# Patient Record
Sex: MALE | HISPANIC OR LATINO | Employment: FULL TIME | URBAN - METROPOLITAN AREA
[De-identification: names, ages, dates, MRNs, and addresses within clinical notes are randomized per-mention and may not be internally consistent; named-entity substitution may affect disease eponyms.]

---

## 2019-07-18 ENCOUNTER — OFFICE VISIT (OUTPATIENT)
Dept: URGENT CARE | Facility: CLINIC | Age: 39
End: 2019-07-18
Payer: OTHER MISCELLANEOUS

## 2019-07-18 VITALS
WEIGHT: 200 LBS | BODY MASS INDEX: 27.09 KG/M2 | HEART RATE: 69 BPM | HEIGHT: 72 IN | RESPIRATION RATE: 19 BRPM | OXYGEN SATURATION: 100 % | TEMPERATURE: 98 F | DIASTOLIC BLOOD PRESSURE: 93 MMHG | SYSTOLIC BLOOD PRESSURE: 138 MMHG

## 2019-07-18 DIAGNOSIS — T14.8XXA PUNCTURE WOUND: Primary | ICD-10-CM

## 2019-07-18 PROCEDURE — 90714 TD VACC NO PRESV 7 YRS+ IM: CPT | Mod: S$GLB,,, | Performed by: FAMILY MEDICINE

## 2019-07-18 PROCEDURE — 99203 OFFICE O/P NEW LOW 30 MIN: CPT | Mod: 25,S$GLB,, | Performed by: FAMILY MEDICINE

## 2019-07-18 PROCEDURE — 90714 TD VACCINE GREATER THAN OR EQUAL TO 7YO WITH PRESERVATIVE IM: ICD-10-PCS | Mod: S$GLB,,, | Performed by: FAMILY MEDICINE

## 2019-07-18 PROCEDURE — 90471 IMMUNIZATION ADMIN: CPT | Mod: S$GLB,,, | Performed by: FAMILY MEDICINE

## 2019-07-18 PROCEDURE — 99203 PR OFFICE/OUTPT VISIT, NEW, LEVL III, 30-44 MIN: ICD-10-PCS | Mod: 25,S$GLB,, | Performed by: FAMILY MEDICINE

## 2019-07-18 PROCEDURE — 90471 TD VACCINE GREATER THAN OR EQUAL TO 7YO WITH PRESERVATIVE IM: ICD-10-PCS | Mod: S$GLB,,, | Performed by: FAMILY MEDICINE

## 2019-07-18 RX ORDER — AMLODIPINE BESYLATE 5 MG/1
5 TABLET ORAL DAILY
COMMUNITY

## 2019-07-18 NOTE — PROGRESS NOTES
Subjective:       Patient ID: Nick Villegas is a 38 y.o. male.    Vitals:  height is 6' (1.829 m) and weight is 90.7 kg (200 lb). His oral temperature is 97.8 °F (36.6 °C). His blood pressure is 138/93 (abnormal) and his pulse is 69. His respiration is 19 and oxygen saturation is 100%.     Chief Complaint: Laceration (R arm) and Immunizations    38 Male sustained a puncture wound near his right elbow 24 hours prior. Wound was from a non-daysi nail attached to a piece of wood. Penetration was 0.5 inches. Denies any pain around wound. Denies any d/c. Pt denies any tightness of jaw or other muscles.    Laceration    The incident occurred 12 to 24 hours ago. The laceration is located on the right arm. The laceration is 4 cm in size. The laceration mechanism was a nail. The patient is experiencing no pain. He reports no foreign bodies present. His tetanus status is out of date.       Constitution: Negative for chills, fatigue and fever.   HENT: Negative for congestion and sore throat.    Neck: Negative for painful lymph nodes.   Cardiovascular: Negative for chest pain and leg swelling.   Eyes: Negative for double vision and blurred vision.   Respiratory: Negative for cough and shortness of breath.    Gastrointestinal: Negative for nausea, vomiting and diarrhea.   Genitourinary: Negative for dysuria, frequency and urgency.   Musculoskeletal: Negative for joint pain, joint swelling, muscle cramps and muscle ache.   Skin: Positive for laceration. Negative for color change, pale, rash and erythema.   Allergic/Immunologic: Negative for seasonal allergies.   Neurological: Negative for dizziness, history of vertigo, light-headedness, passing out and headaches.   Hematologic/Lymphatic: Negative for swollen lymph nodes, easy bruising/bleeding and history of blood clots. Does not bruise/bleed easily.   Psychiatric/Behavioral: Negative for nervous/anxious, sleep disturbance and depression. The patient is not nervous/anxious.       "  Objective:      Physical Exam   Constitutional: He is oriented to person, place, and time. He appears well-developed and well-nourished.   HENT:   Mouth/Throat: Uvula is midline, oropharynx is clear and moist and mucous membranes are normal.   No signs of lock jaw   Eyes: Pupils are equal, round, and reactive to light. Conjunctivae and EOM are normal.   Neck: Normal range of motion. Neck supple.   Musculoskeletal: Normal range of motion.   Neurological: He is alert and oriented to person, place, and time. He has normal strength. No cranial nerve deficit. He exhibits normal muscle tone. He displays a negative Romberg sign. Coordination normal.   Skin: Skin is warm and dry. Capillary refill takes less than 2 seconds. Laceration noted. No abrasion, no bruising, no burn, no ecchymosis, no lesion and no petechiae noted. No erythema.        Well healed. No tenderness. Full ROM               Assessment:       1. Puncture wound        Plan:         Puncture wound  -     (In Office Administered) Td Vaccine      Patient Instructions     Immunization Information: Tetanus  You received a tetanus shot today. This shot was given to protect you from an infection with the tetanus bacteria. These bacteria are found in the soil. You can get a tetanus infection (also called "lockjaw") from a dirty wound, cut, puncture, abrasion, or other break in the skin. Tetanus can be deadly. For this reason, it is vital to be vaccinated against it. If you have never had a tetanus vaccination, 3 shots are needed to fully protect you. You received the first shot today. You can get your next 2 shots:  · From your usual healthcare provider  · From the local public health department  · From our facility  When to have your next 2 shots  · Return for your next shot in 4 weeks: _________________________________________________     · Return for your last shot in 6 to 12 months:  ___________________________________________________     Once you are " "finished with the 3 shots, you will be fully immunized. This means you will be protected against getting tetanus for up to 10 years.  If you have an injury that is very risky before this time, you may receive a "booster" shot.  To help you remember the date of your last tetanus shot, write it on the back of your 's license or other ID.  When to seek medical advice  After a tetanus shot, most persons have only mild soreness in the arm for a day or so. Contact your healthcare provider or this facility if you develop symptoms of allergic reaction, which include:  · Pain, redness, and swelling at the injection site  · Trouble breathing  · Rash  Date Last Reviewed: 9/25/2015  © 7561-1285 [employer]. [address] [place] [address]. All rights reserved. This information is not intended as a substitute for professional medical care. Always follow your healthcare professional's instructions.        Immunization Information: Tetanus  You received a tetanus shot today. This shot was given to protect you from an infection with the tetanus bacteria. These bacteria are found in the soil. You can get a tetanus infection (also called "lockjaw") from a dirty wound, cut, puncture, abrasion, or other break in the skin. Tetanus can be deadly. For this reason, it is vital to be vaccinated against it. If you have never had a tetanus vaccination, 3 shots are needed to fully protect you. You received the first shot today. You can get your next 2 shots:  · From your usual healthcare provider  · From the local public health department  · From our facility  When to have your next 2 shots  · Return for your next shot in 4 weeks: _________________________________________________     · Return for your last shot in 6 to 12 months:  ___________________________________________________     Once you are finished with the 3 shots, you will be fully immunized. This means you will be protected against getting tetanus for " "up to 10 years.  If you have an injury that is very risky before this time, you may receive a "booster" shot.  To help you remember the date of your last tetanus shot, write it on the back of your 's license or other ID.  When to seek medical advice  After a tetanus shot, most persons have only mild soreness in the arm for a day or so. Contact your healthcare provider or this facility if you develop symptoms of allergic reaction, which include:  · Pain, redness, and swelling at the injection site  · Trouble breathing  · Rash  Date Last Reviewed: 9/25/2015  © 5281-8690 [employer]. [address] [place] [address]. All rights reserved. This information is not intended as a substitute for professional medical care. Always follow your healthcare professional's instructions.             "

## 2019-07-18 NOTE — PATIENT INSTRUCTIONS
"  Immunization Information: Tetanus  You received a tetanus shot today. This shot was given to protect you from an infection with the tetanus bacteria. These bacteria are found in the soil. You can get a tetanus infection (also called "lockjaw") from a dirty wound, cut, puncture, abrasion, or other break in the skin. Tetanus can be deadly. For this reason, it is vital to be vaccinated against it. If you have never had a tetanus vaccination, 3 shots are needed to fully protect you. You received the first shot today. You can get your next 2 shots:  · From your usual healthcare provider  · From the local public health department  · From our facility  When to have your next 2 shots  · Return for your next shot in 4 weeks: _________________________________________________     · Return for your last shot in 6 to 12 months:  ___________________________________________________     Once you are finished with the 3 shots, you will be fully immunized. This means you will be protected against getting tetanus for up to 10 years.  If you have an injury that is very risky before this time, you may receive a "booster" shot.  To help you remember the date of your last tetanus shot, write it on the back of your 's license or other ID.  When to seek medical advice  After a tetanus shot, most persons have only mild soreness in the arm for a day or so. Contact your healthcare provider or this facility if you develop symptoms of allergic reaction, which include:  · Pain, redness, and swelling at the injection site  · Trouble breathing  · Rash  Date Last Reviewed: 9/25/2015  © 8063-3625 [employer]. [address] [place] [id number]. All rights reserved. This information is not intended as a substitute for professional medical care. Always follow your healthcare professional's instructions.        Immunization Information: Tetanus  You received a tetanus shot today. This shot was given to protect you from an " "infection with the tetanus bacteria. These bacteria are found in the soil. You can get a tetanus infection (also called "lockjaw") from a dirty wound, cut, puncture, abrasion, or other break in the skin. Tetanus can be deadly. For this reason, it is vital to be vaccinated against it. If you have never had a tetanus vaccination, 3 shots are needed to fully protect you. You received the first shot today. You can get your next 2 shots:  · From your usual healthcare provider  · From the local public health department  · From our facility  When to have your next 2 shots  · Return for your next shot in 4 weeks: _________________________________________________     · Return for your last shot in 6 to 12 months:  ___________________________________________________     Once you are finished with the 3 shots, you will be fully immunized. This means you will be protected against getting tetanus for up to 10 years.  If you have an injury that is very risky before this time, you may receive a "booster" shot.  To help you remember the date of your last tetanus shot, write it on the back of your 's license or other ID.  When to seek medical advice  After a tetanus shot, most persons have only mild soreness in the arm for a day or so. Contact your healthcare provider or this facility if you develop symptoms of allergic reaction, which include:  · Pain, redness, and swelling at the injection site  · Trouble breathing  · Rash  Date Last Reviewed: 9/25/2015  © 7986-0781 [employer]. [address] [place] [address], [place] [address]. All rights reserved. This information is not intended as a substitute for professional medical care. Always follow your healthcare professional's instructions.        "

## 2019-07-19 ENCOUNTER — TELEPHONE (OUTPATIENT)
Dept: URGENT CARE | Facility: CLINIC | Age: 39
End: 2019-07-19

## 2019-07-19 NOTE — TELEPHONE ENCOUNTER
Spoke with patient. He reports initial pain from TD injection but overall reports feeling much better.